# Patient Record
Sex: FEMALE | Race: OTHER | URBAN - METROPOLITAN AREA
[De-identification: names, ages, dates, MRNs, and addresses within clinical notes are randomized per-mention and may not be internally consistent; named-entity substitution may affect disease eponyms.]

---

## 2022-07-01 ENCOUNTER — OUTPATIENT (OUTPATIENT)
Dept: OUTPATIENT SERVICES | Facility: HOSPITAL | Age: 32
LOS: 1 days | Discharge: HOME | End: 2022-07-01

## 2024-04-29 PROBLEM — Z00.00 ENCOUNTER FOR PREVENTIVE HEALTH EXAMINATION: Status: ACTIVE | Noted: 2024-04-29

## 2024-04-30 ENCOUNTER — OUTPATIENT (OUTPATIENT)
Dept: OUTPATIENT SERVICES | Facility: HOSPITAL | Age: 34
LOS: 1 days | End: 2024-04-30
Payer: COMMERCIAL

## 2024-04-30 DIAGNOSIS — Z00.8 ENCOUNTER FOR OTHER GENERAL EXAMINATION: ICD-10-CM

## 2024-04-30 DIAGNOSIS — N64.4 MASTODYNIA: ICD-10-CM

## 2024-04-30 PROCEDURE — G0279: CPT | Mod: 26

## 2024-04-30 PROCEDURE — 77066 DX MAMMO INCL CAD BI: CPT

## 2024-04-30 PROCEDURE — 77066 DX MAMMO INCL CAD BI: CPT | Mod: 26

## 2024-04-30 PROCEDURE — G0279: CPT

## 2024-04-30 PROCEDURE — 76641 ULTRASOUND BREAST COMPLETE: CPT | Mod: 26,50

## 2024-04-30 PROCEDURE — 76641 ULTRASOUND BREAST COMPLETE: CPT | Mod: 50

## 2024-05-01 DIAGNOSIS — N64.4 MASTODYNIA: ICD-10-CM

## 2024-06-03 ENCOUNTER — NON-APPOINTMENT (OUTPATIENT)
Age: 34
End: 2024-06-03

## 2024-06-23 ENCOUNTER — OUTPATIENT (OUTPATIENT)
Dept: OUTPATIENT SERVICES | Facility: HOSPITAL | Age: 34
LOS: 1 days | End: 2024-06-23
Payer: COMMERCIAL

## 2024-06-23 DIAGNOSIS — Z00.8 ENCOUNTER FOR OTHER GENERAL EXAMINATION: ICD-10-CM

## 2024-06-23 DIAGNOSIS — R10.2 PELVIC AND PERINEAL PAIN: ICD-10-CM

## 2024-06-23 PROCEDURE — 76830 TRANSVAGINAL US NON-OB: CPT

## 2024-06-23 PROCEDURE — 76856 US EXAM PELVIC COMPLETE: CPT

## 2024-06-23 PROCEDURE — 76830 TRANSVAGINAL US NON-OB: CPT | Mod: 26

## 2024-06-23 PROCEDURE — 76856 US EXAM PELVIC COMPLETE: CPT | Mod: 26

## 2024-06-24 DIAGNOSIS — R10.2 PELVIC AND PERINEAL PAIN: ICD-10-CM

## 2025-06-18 ENCOUNTER — NON-APPOINTMENT (OUTPATIENT)
Age: 35
End: 2025-06-18

## 2025-07-16 ENCOUNTER — EMERGENCY (EMERGENCY)
Facility: HOSPITAL | Age: 35
LOS: 0 days | Discharge: HOME | End: 2025-07-17
Attending: STUDENT IN AN ORGANIZED HEALTH CARE EDUCATION/TRAINING PROGRAM
Payer: COMMERCIAL

## 2025-07-16 VITALS
TEMPERATURE: 98 F | DIASTOLIC BLOOD PRESSURE: 80 MMHG | SYSTOLIC BLOOD PRESSURE: 113 MMHG | RESPIRATION RATE: 19 BRPM | OXYGEN SATURATION: 99 % | HEART RATE: 85 BPM

## 2025-07-16 PROCEDURE — 85025 COMPLETE CBC W/AUTO DIFF WBC: CPT

## 2025-07-16 PROCEDURE — 80048 BASIC METABOLIC PNL TOTAL CA: CPT

## 2025-07-16 PROCEDURE — 93005 ELECTROCARDIOGRAM TRACING: CPT

## 2025-07-16 PROCEDURE — 99285 EMERGENCY DEPT VISIT HI MDM: CPT | Mod: 25

## 2025-07-16 PROCEDURE — 36000 PLACE NEEDLE IN VEIN: CPT

## 2025-07-16 PROCEDURE — 36415 COLL VENOUS BLD VENIPUNCTURE: CPT

## 2025-07-16 PROCEDURE — 84703 CHORIONIC GONADOTROPIN ASSAY: CPT

## 2025-07-16 PROCEDURE — 93010 ELECTROCARDIOGRAM REPORT: CPT

## 2025-07-16 PROCEDURE — 99284 EMERGENCY DEPT VISIT MOD MDM: CPT | Mod: 25

## 2025-07-16 PROCEDURE — 71046 X-RAY EXAM CHEST 2 VIEWS: CPT | Mod: 26

## 2025-07-16 PROCEDURE — 71046 X-RAY EXAM CHEST 2 VIEWS: CPT

## 2025-07-16 RX ORDER — ACETAMINOPHEN 500 MG/5ML
650 LIQUID (ML) ORAL ONCE
Refills: 0 | Status: COMPLETED | OUTPATIENT
Start: 2025-07-16 | End: 2025-07-16

## 2025-07-16 RX ORDER — SODIUM CHLORIDE 9 G/1000ML
1000 INJECTION, SOLUTION INTRAVENOUS ONCE
Refills: 0 | Status: COMPLETED | OUTPATIENT
Start: 2025-07-16 | End: 2025-07-16

## 2025-07-16 RX ADMIN — SODIUM CHLORIDE 1000 MILLILITER(S): 9 INJECTION, SOLUTION INTRAVENOUS at 23:59

## 2025-07-16 NOTE — ED PROVIDER NOTE - PHYSICAL EXAMINATION
End of summary report as per Dr Shahab Grace (Dr Laddie Lanes) 
CONSTITUTIONAL: Well-developed; well-nourished; in no acute distress.   SKIN: warm, dry  HEAD: Normocephalic  EYES: no conjunctival erythema  ENT: No nasal discharge; airway clear.  NECK: Supple  CARD: Regular rate and rhythm.   RESP: No wheezes, rales or rhonchi.  ABD: soft ntnd  EXT: Normal ROM.  No clubbing, cyanosis or edema.   NEURO: Alert, oriented, grossly unremarkable  PSYCH: Cooperative, appropriate.

## 2025-07-16 NOTE — ED PROVIDER NOTE - OBJECTIVE STATEMENT
34 yo female, no PMHx, presenting for cough, lightheadedness, heavy breathing that started today.  She took a covid test at home that was positive.  Denies fevers, chest pain, nausea, vomiting, abdominal pain.  Did not take any medications today.

## 2025-07-16 NOTE — ED PROVIDER NOTE - NSFOLLOWUPINSTRUCTIONS_ED_ALL_ED_FT
COVID-19 (Coronavirus Disease 2019)    WHAT YOU NEED TO KNOW:    What do I need to know about COVID-19? COVID-19 is the disease caused by a coronavirus first discovered in December 2019. The virus has changed into several new forms (called variants) since it was discovered. A variant may be more easily spread or cause more severe illness than the original form.    What are the signs and symptoms of COVID-19? Signs and symptoms usually start about 5 days after infection but can take 2 to 14 days. You may feel like you have the flu or a bad cold. Some signs and symptoms go away in a few days. Others can last weeks, months, or possibly years. You may have any of the following:    A cough or sore throat    Shortness of breath or trouble breathing that may become severe    A fever, possibly with chills and shaking    Muscle pain, body aches, or a headache    Sudden changes in or loss of your taste or smell    Feeling mentally and physically tired (fatigue)    Congestion (stuffy head and nose), or a runny nose    Diarrhea, nausea, or vomiting  How is COVID-19 diagnosed? Your healthcare provider will examine you and ask about your symptoms. Tell your provider if you have any health conditions or are taking any medicines. Your provider can help you create a COVID-19 plan if you are at high risk for severe illness. Some tests are available for you to do at home. Most use a nasal swab and give the result within 15 minutes. Testing sites may also be available. Test immediately if you have symptoms. Wait 5 days after exposure if you do not have symptoms. Any of the following tests may be used:    A PCR test shows if you have a current infection. A sample is taken from your nose or throat with a swab. You may need to quarantine until you get the results from a testing site.    An antigen test shows if you have a protein from the COVID-19 virus. This test is often called a rapid test because the results are available in 30 minutes or less. Without symptoms, you need 3 negative antigen results to be certain you do not have COVID-19. With symptoms, you need 2 negative results. Antigen tests should be taken 48 hours apart. A negative PCR result is considered confirmation of the first negative antigen result.    An antibody test shows if you had a recent or past infection. Blood samples are used for this test. Antibodies are made by your immune system to fight the virus that causes COVID-19. Antibodies form 1 to 3 weeks after you are infected. This test is not used to show if you are immune to the virus.    A CT, MRI, ultrasound, or x-ray may show complications of COVID-19, such as pneumonia or blood clots.  How is COVID-19 treated?    Mild symptoms may get better on their own. Some treatments have emergency use authorization (EUA). Examples include monoclonal antibodies and convalescent plasma. These may be given to help prevent worsening of your symptoms. You may also need any of the following:  Decongestants help reduce nasal congestion and help you breathe more easily. If you take decongestant pills, they may make you feel restless or cause problems with your sleep. Do not use decongestant sprays for more than a few days.    Cough suppressants help reduce coughing. Ask your healthcare provider which type of cough medicine is best for you.    To soothe a sore throat, gargle with warm salt water, or use throat lozenges or a throat spray. Drink more liquids to thin and loosen mucus and to prevent dehydration.    NSAIDs or acetaminophen can help lower a fever and relieve body aches or a headache. Follow directions. If not taken correctly, NSAIDs can cause kidney damage and acetaminophen can cause liver damage.    Antiviral medicines may be given if you are at risk for developing severe or life-threatening symptoms.    Severe or life-threatening symptoms are treated in the hospital. You may need any of the following:  Medicines may be given to fight the virus or treat inflammation.    Blood thinners help prevent or treat blood clots. If you have a deep vein thrombosis (DVT) or pulmonary embolism (PE), you may need to use blood thinners for at least 3 months.    Extra oxygen may be given if you have respiratory failure. This means your lungs cannot get enough oxygen into your blood and out to your organs.    A ventilator may be used to help you breathe.  What do I need to know about long COVID? Long COVID is a term to describe health problems the virus may cause.    Certain conditions increase your risk for long COVID. Your risk is higher if you are 65 or older or a current or former smoker. A weak immune system, obesity, diabetes, or kidney, heart, or lung disease can also increase your risk.    Long COVID may cause severe or chronic health problems. The most common problem is trouble thinking, remembering, or concentrating. You may also develop shortness of breath or a serious respiratory condition such as pneumonia. Blood clots may form and lead to organ damage. You may have nerve pain, trouble sleeping, or mood changes.    COVID-19 can lead to severe inflammation in your organs. This is also called multisymptom inflammatory syndrome in adults (MIS-A) or MIS-C in children. Inflammation may affect the heart, digestive system, skin, or brain.  How does the virus spread? The virus can be spread starting 2 to 3 days before symptoms begin. Close personal contact with an infected person increases your risk for infection. This means being within 6 feet (2 meters) of the person for at least 15 minutes over 24 hours.    The virus travels in droplets that form when a person talks, sings, coughs, or sneezes. The droplets can also float in the air for minutes or hours. Infection happens when you breathe in the droplets or get them in your eyes or nose.    Person-to-person contact can spread the virus. For example, a person with the virus on his or her hands can spread it by shaking hands with someone.    The virus can stay on objects and surfaces for hours to days. You may become infected by touching the object or surface and then touching your eyes or mouth.  What do I need to know about COVID-19 vaccines? Healthcare providers recommend vaccination, even if you already had COVID-19.    Get a COVID-19 vaccine as directed. At least 1 dose of an updated vaccine is recommended for everyone 6 months or older. COVID-19 vaccines are given as a shot in 1 to 3 doses, depending on the age of the person who receives it. COVID-19 vaccines are updated throughout the year. Your healthcare provider can help you schedule all needed doses as updated vaccines become available.  Recommended COVID-19 Immunization Schedule      Continue to protect yourself and others. You can become infected even after you get the vaccine. You may also be able to pass the virus to others without knowing you are infected.    After you get the vaccine, check local, national, and international travel rules. You may need to be tested before you travel. Some countries require proof of a negative test before you travel. You may also need to quarantine after you return.    Medicine may be given to prevent infection. The medicine can be given if you are at high risk for infection and cannot get the vaccine. It can also be given if your immune system does not respond well to the vaccine.  How can I help lower my risk for COVID-19 during an active outbreak?    Stay home if you are sick or think you may have COVID-19. It is important to stay home if you are waiting for a testing appointment or for test results.    Wash your hands often throughout the day. Use soap and water. Rub your soapy hands together, lacing your fingers, for at least 20 seconds. Rinse with warm, running water. Dry your hands with a clean towel or paper towel. Use hand  that contains alcohol if soap and water are not available. Teach children how to wash their hands and use hand .  Handwashing      Cover sneezes and coughs. Turn your face away and cover your mouth and nose with a tissue. Throw the tissue away. Use the bend of your arm if a tissue is not available. Then wash your hands well with soap and water or use hand . Teach children how to cover a cough or sneeze.    Wear a face covering (mask) when needed. Use a cloth covering with at least 2 layers. You can also create layers by putting a cloth covering over a disposable non-medical mask. Cover your mouth and your nose.  How to Wear a Face Covering (Mask)      Try to keep space between you and others when you are out of the house. Avoid crowds as much as possible. Wear a face covering when you must be around a large group and cannot keep space between you and others.    Clean and disinfect high-touch surfaces and objects often. Use disinfecting wipes, or make a solution of 4 teaspoons of bleach in 1 quart (4 cups) of water.    Ask about other vaccines you may need. Get the influenza (flu) vaccine as soon as recommended each year, usually starting in September or October. Get the pneumonia and respiratory syncytial virus (RSV) vaccines, if recommended. Your healthcare provider can tell you if you should also get other vaccines, and when to get them.  Prevent COVID-19 Infection  Where can I find more information?    Centers for Disease Control and Prevention  [address] [place] [address]  Phone: 1-963.235.4708  Web Address: http://www.cdc.gov  Call your local emergency number (911 in the [place]) if:    You have trouble breathing or shortness of breath at rest.    You have chest pain or pressure that lasts longer than 5 minutes.    You become confused or hard to wake.  When should I seek immediate care?    You have trouble staying awake during the day.    Your nail beds, face, fingers, or toes look blue or darker than usual.  When should I call my doctor?    You have a fever.    You have questions or concerns about your condition or care.  CARE AGREEMENT:    You have the right to help plan your care. Learn about your health condition and how it may be treated. Discuss treatment options with your healthcare providers to decide what care you want to receive. You always have the right to refuse treatment.

## 2025-07-16 NOTE — ED PROVIDER NOTE - PATIENT PORTAL LINK FT
You can access the FollowMyHealth Patient Portal offered by [facility] by registering at the following website: [email or web address]. By joining [facility]’s FollowMyHealth portal, you will also be able to view your health information using other applications (apps) compatible with our system.

## 2025-07-16 NOTE — ED PROVIDER NOTE - NSFOLLOWUPCLINICS_GEN_ALL_ED_FT
[facility]  Medicine  [address], [place]   Phone: [phone]  Fax:   Follow Up Time: 1-3 Days

## 2025-07-16 NOTE — ED PROVIDER NOTE - CLINICAL SUMMARY MEDICAL DECISION MAKING FREE TEXT BOX
36 yo female, no PMHx, presenting for cough, lightheadedness, heavy breathing that started today.  She took a covid test at home that was positive.  Denies fevers, chest pain, nausea, vomiting, abdominal pain.  Did not take any medications today. 36 yo female, no PMHx, presenting for cough, lightheadedness, heavy breathing that started today.  She took a covid test at home that was positive.  Denies fevers, chest pain, nausea, vomiting, abdominal pain.  Did not take any medications today.  Labs were ordered and reviewed.  Imaging was ordered and reviewed by me.  Discussed all results with patient and .  Patient afebrile.  Given return precautions and follow up outpatient with PMD.  Patient  comfortable with plan. other

## 2025-07-16 NOTE — ED ADULT TRIAGE NOTE - CHIEF COMPLAINT QUOTE
Pt presented to ED c/o SOB x1 day, took a Covid test at home and was positive Pt presented to ED c/o SOB x1 day, took a Covid test at home and was positive.

## 2025-07-16 NOTE — ED PROVIDER NOTE - DISPOSITION TYPE
R.I.C.E.    R.I.C.E. stands for Rest, Ice, Compression, and Elevation. Doing these things helps limit pain and swelling after an injury. R.I.C.E. also helps injuries heal faster. Use R.I.C.E. for sprains, strains, and severe bruises or bumps. Follow the tips on this handout and begin R.I.C.E. as soon as possible after an injury.     Rest  Pain is your body's way of telling you to rest an injured area. Whether you have hurt an elbow, hand, foot, or knee, limiting its use will prevent further injury and help you heal.     Ice  Applying ice right after an injury helps prevent swelling and reduce pain. Don't place ice directly on your skin.  Wrap a cold pack or bag of ice in a thin cloth. Place it over the injured area.  Ice for 10 minutes every 3 hours. Don't ice for more than 20 minutes at a time.     Compression  Putting pressure (compression) on an injury helps prevent swelling and provides support.  Wrap the injured area firmly with an elastic bandage. If your hand or foot tingles, becomes discolored, or feels cold to the touch, the bandage may be too tight. Rewrap it more loosely.  If your bandage becomes too loose, rewrap it.  Do not wear an elastic bandage overnight.    Elevation  Keeping an injury elevated helps reduce swelling, pain, and throbbing. Elevation is most effective when the injury is kept elevated higher than the heart.     Call your healthcare provider if you notice any of the following:  Fingers or toes feel numb, are cold to the touch, or change color  Skin looks shiny or tight  Pain, swelling, or bruising worsens and is not improved with elevation     
DISCHARGE

## 2025-07-17 LAB
ANION GAP SERPL CALC-SCNC: 9 MMOL/L — SIGNIFICANT CHANGE UP (ref 7–14)
BASOPHILS # BLD AUTO: 0.05 K/UL — SIGNIFICANT CHANGE UP (ref 0–0.2)
BASOPHILS NFR BLD AUTO: 1 % — SIGNIFICANT CHANGE UP (ref 0–1)
BUN SERPL-MCNC: 10 MG/DL — SIGNIFICANT CHANGE UP (ref 10–20)
CALCIUM SERPL-MCNC: 8.9 MG/DL — SIGNIFICANT CHANGE UP (ref 8.4–10.5)
CHLORIDE SERPL-SCNC: 104 MMOL/L — SIGNIFICANT CHANGE UP (ref 98–110)
CO2 SERPL-SCNC: 24 MMOL/L — SIGNIFICANT CHANGE UP (ref 17–32)
CREAT SERPL-MCNC: 0.8 MG/DL — SIGNIFICANT CHANGE UP (ref 0.7–1.5)
EGFR: 98 ML/MIN/1.73M2 — SIGNIFICANT CHANGE UP
EGFR: 98 ML/MIN/1.73M2 — SIGNIFICANT CHANGE UP
EOSINOPHIL # BLD AUTO: 0.31 K/UL — SIGNIFICANT CHANGE UP (ref 0–0.7)
EOSINOPHIL NFR BLD AUTO: 6.3 % — SIGNIFICANT CHANGE UP (ref 0–8)
GLUCOSE SERPL-MCNC: 94 MG/DL — SIGNIFICANT CHANGE UP (ref 70–99)
HCG SERPL QL: NEGATIVE — SIGNIFICANT CHANGE UP
HCT VFR BLD CALC: 38 % — SIGNIFICANT CHANGE UP (ref 37–47)
HGB BLD-MCNC: 12.9 G/DL — SIGNIFICANT CHANGE UP (ref 12–16)
IMM GRANULOCYTES NFR BLD AUTO: 0.2 % — SIGNIFICANT CHANGE UP (ref 0.1–0.3)
LYMPHOCYTES # BLD AUTO: 2 K/UL — SIGNIFICANT CHANGE UP (ref 1.2–3.4)
LYMPHOCYTES # BLD AUTO: 40.3 % — SIGNIFICANT CHANGE UP (ref 20.5–51.1)
MCHC RBC-ENTMCNC: 30 PG — SIGNIFICANT CHANGE UP (ref 27–31)
MCHC RBC-ENTMCNC: 33.9 G/DL — SIGNIFICANT CHANGE UP (ref 32–37)
MCV RBC AUTO: 88.4 FL — SIGNIFICANT CHANGE UP (ref 81–99)
MONOCYTES # BLD AUTO: 0.5 K/UL — SIGNIFICANT CHANGE UP (ref 0.1–0.6)
MONOCYTES NFR BLD AUTO: 10.1 % — HIGH (ref 1.7–9.3)
NEUTROPHILS # BLD AUTO: 2.09 K/UL — SIGNIFICANT CHANGE UP (ref 1.4–6.5)
NEUTROPHILS NFR BLD AUTO: 42.1 % — LOW (ref 42.2–75.2)
NRBC BLD AUTO-RTO: 0 /100 WBCS — SIGNIFICANT CHANGE UP (ref 0–0)
PLATELET # BLD AUTO: 294 K/UL — SIGNIFICANT CHANGE UP (ref 130–400)
PMV BLD: 9.8 FL — SIGNIFICANT CHANGE UP (ref 7.4–10.4)
POTASSIUM SERPL-MCNC: 4.2 MMOL/L — SIGNIFICANT CHANGE UP (ref 3.5–5)
POTASSIUM SERPL-SCNC: 4.2 MMOL/L — SIGNIFICANT CHANGE UP (ref 3.5–5)
RBC # BLD: 4.3 M/UL — SIGNIFICANT CHANGE UP (ref 4.2–5.4)
RBC # FLD: 12.3 % — SIGNIFICANT CHANGE UP (ref 11.5–14.5)
SODIUM SERPL-SCNC: 137 MMOL/L — SIGNIFICANT CHANGE UP (ref 135–146)
WBC # BLD: 4.96 K/UL — SIGNIFICANT CHANGE UP (ref 4.8–10.8)
WBC # FLD AUTO: 4.96 K/UL — SIGNIFICANT CHANGE UP (ref 4.8–10.8)

## 2025-07-17 RX ADMIN — Medication 650 MILLIGRAM(S): at 00:00

## 2025-07-18 DIAGNOSIS — R05.1 ACUTE COUGH: ICD-10-CM

## 2025-07-18 DIAGNOSIS — U07.1 COVID-19: ICD-10-CM

## 2025-07-18 DIAGNOSIS — R42 DIZZINESS AND GIDDINESS: ICD-10-CM
